# Patient Record
(demographics unavailable — no encounter records)

---

## 2024-10-18 NOTE — HISTORY OF PRESENT ILLNESS
[FreeTextEntry1] : 70 year old male with HTN, rheumatoid arthritis, CAD s/p prior MI and PCI, history of AVR and mitral valve repair 2019 and atrial fibrillation s/p DCCV 5/2022, who presents for follow up s/p cryo ablation + CTI for typical flutter 7/18/22.   Admitted to OSH for work-up of severe hip pain.  No signs of infection.  Hospital course c/b atrial fibrillation with RVR.  No definitive diagnosis was made; ? inflammatory.  Continues to feel well on current regimen of Toprol XL 50 BID and Dilt  mg daily.   Now s/p DCCV to SR on 8/10/23.  He has not had recurrent palpitation since that time.    No palpitations, chest pain, SOB, syncope, near syncope or edema.  He is compliant with Xarelto and denies any bleeding issues.      Of note he occasionally takes PRN Prednisone for flare up of his RA.

## 2025-01-28 NOTE — DISCUSSION/SUMMARY
[FreeTextEntry1] : 70-year-old man with 1) Recurrent persistent AF s/p cryoPVI / CTI many years ago, DCCV 2023 2) CAD s/p MI s/p PCI - on plavix twice weekly 3) HTN 4) RA  - We re-discussed the ablation procedure including the benefits, risks, alternatives.  All questions answered. - Continue current meds.  He will take Xarelto 20mg prior to arrival Thurs.   [EKG obtained to assist in diagnosis and management of assessed problem(s)] : EKG obtained to assist in diagnosis and management of assessed problem(s)

## 2025-01-28 NOTE — CARDIOLOGY SUMMARY
[de-identified] : 1/28/25: coarse AF w/ CVR, old AWMI/IWMI 10/18/2024: SB at 56 bpm with old AWMI/IWMI changes

## 2025-01-28 NOTE — REVIEW OF SYSTEMS
[Dyspnea on exertion] : dyspnea during exertion [Palpitations] : no palpitations [Orthopnea] : no orthopnea [Syncope] : no syncope [Negative] : Heme/Lymph

## 2025-01-28 NOTE — CARDIOLOGY SUMMARY
Dailyoroned with DR. Tao   [de-identified] : 1/28/25: coarse AF w/ CVR, old AWMI/IWMI 10/18/2024: SB at 56 bpm with old AWMI/IWMI changes

## 2025-01-28 NOTE — PHYSICAL EXAM
[Well Developed] : well developed [Well Nourished] : well nourished [No Acute Distress] : no acute distress [Normal Conjunctiva] : normal conjunctiva [Normal Venous Pressure] : normal venous pressure [No Carotid Bruit] : no carotid bruit [Normal S1, S2] : normal S1, S2 [Irregularly Irregular] : irregularly irregular [Normal S1] : normal S1 [Normal S2] : normal S2 [Clear Lung Fields] : clear lung fields [Good Air Entry] : good air entry [No Respiratory Distress] : no respiratory distress  [Soft] : abdomen soft [Non Tender] : non-tender [No Masses/organomegaly] : no masses/organomegaly [Normal Bowel Sounds] : normal bowel sounds [Normal Gait] : normal gait [No Edema] : no edema [No Cyanosis] : no cyanosis [No Clubbing] : no clubbing [No Varicosities] : no varicosities [No Rash] : no rash [No Skin Lesions] : no skin lesions [Moves all extremities] : moves all extremities [No Focal Deficits] : no focal deficits [Normal Speech] : normal speech [Alert and Oriented] : alert and oriented [Normal memory] : normal memory

## 2025-01-28 NOTE — HISTORY OF PRESENT ILLNESS
[FreeTextEntry1] : 70-year-old male with HTN, rheumatoid arthritis, CAD s/p prior MI and PCI, history of AVR and mitral valve repair 2019 and recurrent persistent atrial fibrillation s/p DCCV 5/2022, cryoPVI + CTI 7/2022, DCCV 8/2023, who presents for evaluation prior to redo ablation Thurs 1/30/25.   ECG today shows coarse AF with a controlled ventricular response.   Bardy 10/2024 showed sinus rhythm with short salvos of AT (15 total, longest 6 beats).   He notes SOB with exertion.  No cp / palps / dizziness.   Medications reviewed. Taking Xarelto 20mg daily after breakfast.  Hasn't used prednisone for RA flare in some time.  On plavix twice a week as per his cardiologist.

## 2025-03-25 NOTE — PHYSICAL EXAM

## 2025-03-25 NOTE — HISTORY OF PRESENT ILLNESS
[FreeTextEntry1] : 70-year-old male with HTN, rheumatoid arthritis, CAD s/p prior MI and PCI, history of AVR and mitral valve repair 2019 and recurrent persistent atrial fibrillation s/p DCCV 5/2022, cryoPVI + CTI 7/2022, DCCV 8/2023, who had redo ablation Thurs 1/30/25. The left veins were reisolated, a Mitral isthmus line was created and atypical AFL was terminated with ablation just outside the RIPV on the septum.  No arrhythmia was inducible post ablation.  The CTI line was intact from the prior ablation.  He has been feeling relatively well since that time but notes that his heart rate recently went up to 100 again. This was last noted about one week ago.  He denies CP/SOB/LH/dizziness.  He has no groin complaints.   ECG today shows an atrial tachy/afl with VR in the 104 bpm range.  Bardy 10/2024 showed sinus rhythm with short salvos of AT (15 total, longest 6 beats).   He notes SOB with exertion.  No cp / palps / dizziness.   Medications reviewed. Taking Xarelto 20mg daily after breakfast.  Hasn't used prednisone for RA flare in some time.  On plavix twice a week as per his cardiologist.

## 2025-04-29 NOTE — CARDIOLOGY SUMMARY
[de-identified] : 4/29/25: SB 54bpm, 1st degree AVB.  [de-identified] : Merlene 10/2024 showed sinus rhythm with short salvos of AT (15 total, longest 6 beats).

## 2025-04-29 NOTE — HISTORY OF PRESENT ILLNESS
[FreeTextEntry1] : 70-year-old male with HTN, rheumatoid arthritis, CAD s/p prior MI and PCI, history of AVR and mitral valve repair 2019 and recurrent persistent atrial fibrillation s/p DCCV 5/2022, cryoPVI + CTI 7/2022, DCCV 8/2023, who had redo ablation Thurs 1/30/25. The left veins were reisolated, a Mitral isthmus line was created and atypical AFL was terminated with ablation just outside the RIPV on the septum.  No arrhythmia was inducible post ablation.  The CTI line was intact from the prior ablation.  He has been feeling relatively well since that time but notes that his heart rate recently went up to 100 again. This was last noted about one week ago.  He denies CP/SOB/LH/dizziness.  He has no groin complaints.   ECG at follow up consistent with an atrial tachy/afl with VR in the 104 bpm range.  Since last follow up he was started on Amiodarone 200mg PO QD.  He had monitored with his [employer] which shows sinus rhythm.  He reports possibly converting back to SR event prior to starting Amiodarone.  He feels well.  He recently saw his cardiologist for elevated BP and was started on Amlodipine.  No palpitations.

## 2025-04-29 NOTE — ADDENDUM
[FreeTextEntry1] : I, Madison Whipple, am scribing for and the presence of Dr. Lott the following sections: HPI, PMH,Family/social history, ROS, Physical Exam, Assessment / Plan. I, Forest Lott, personally performed the services described in the documentation, reviewed the documentation recorded by the scribe in my presence and it accurately and completely records my words and actions.

## 2025-04-29 NOTE — HISTORY OF PRESENT ILLNESS
[FreeTextEntry1] : 70-year-old male with HTN, rheumatoid arthritis, CAD s/p prior MI and PCI, history of AVR and mitral valve repair 2019 and recurrent persistent atrial fibrillation s/p DCCV 5/2022, cryoPVI + CTI 7/2022, DCCV 8/2023, who had redo ablation Thurs 1/30/25. The left veins were reisolated, a Mitral isthmus line was created and atypical AFL was terminated with ablation just outside the RIPV on the septum.  No arrhythmia was inducible post ablation.  The CTI line was intact from the prior ablation.  He has been feeling relatively well since that time but notes that his heart rate recently went up to 100 again. This was last noted about one week ago.  He denies CP/SOB/LH/dizziness.  He has no groin complaints.   ECG at follow up consistent with an atrial tachy/afl with VR in the 104 bpm range.  Since last follow up he was started on Amiodarone 200mg PO QD.  He had monitored with his [employer] which shows sinus rhythm.  He reports possibly converting back to SR event prior to starting Amiodarone.  He feels well.  He recently saw his cardiologist for elevated BP and was started on Amlodipine.  No palpitations.

## 2025-04-29 NOTE — CARDIOLOGY SUMMARY
[de-identified] : 4/29/25: SB 54bpm, 1st degree AVB.  [de-identified] : Merlene 10/2024 showed sinus rhythm with short salvos of AT (15 total, longest 6 beats).